# Patient Record
Sex: MALE | Race: BLACK OR AFRICAN AMERICAN
[De-identification: names, ages, dates, MRNs, and addresses within clinical notes are randomized per-mention and may not be internally consistent; named-entity substitution may affect disease eponyms.]

---

## 2020-07-09 ENCOUNTER — HOSPITAL ENCOUNTER (OUTPATIENT)
Dept: HOSPITAL 47 - RADXRMAIN | Age: 45
Discharge: HOME | End: 2020-07-09
Attending: INTERNAL MEDICINE
Payer: COMMERCIAL

## 2020-07-09 DIAGNOSIS — M25.552: Primary | ICD-10-CM

## 2020-07-09 PROCEDURE — 73502 X-RAY EXAM HIP UNI 2-3 VIEWS: CPT

## 2020-07-09 NOTE — XR
Left hip

 

HISTORY: Left hip pain

 

2 views of the left hip

 

 

 

Bone mineralization, joint spaces and alignment are maintained. Phleboliths are present in the pelvis
. No fracture or dislocation.

 

IMPRESSION: Normal left hip.

## 2021-05-18 ENCOUNTER — HOSPITAL ENCOUNTER (OUTPATIENT)
Dept: HOSPITAL 47 - LABWHC1 | Age: 46
Discharge: HOME | End: 2021-05-18
Attending: EMERGENCY MEDICINE
Payer: COMMERCIAL

## 2021-05-18 DIAGNOSIS — Z20.822: Primary | ICD-10-CM

## 2021-07-10 ENCOUNTER — HOSPITAL ENCOUNTER (OUTPATIENT)
Age: 46
Discharge: HOME | End: 2021-07-10
Payer: COMMERCIAL

## 2021-07-10 DIAGNOSIS — M19.012: Primary | ICD-10-CM

## 2023-05-28 ENCOUNTER — HOSPITAL ENCOUNTER (OUTPATIENT)
Dept: HOSPITAL 47 - EC | Age: 48
Setting detail: OBSERVATION
LOS: 1 days | Discharge: HOME | End: 2023-05-29
Attending: INTERNAL MEDICINE | Admitting: INTERNAL MEDICINE
Payer: COMMERCIAL

## 2023-05-28 DIAGNOSIS — R10.31: Primary | ICD-10-CM

## 2023-05-28 DIAGNOSIS — I10: ICD-10-CM

## 2023-05-28 DIAGNOSIS — Z79.899: ICD-10-CM

## 2023-05-28 LAB
ALBUMIN SERPL-MCNC: 4.5 G/DL (ref 3.5–5)
ALP SERPL-CCNC: 54 U/L (ref 38–126)
ALT SERPL-CCNC: 22 U/L (ref 4–49)
AMYLASE SERPL-CCNC: 93 U/L (ref 30–110)
ANION GAP SERPL CALC-SCNC: 9 MMOL/L
APTT BLD: 25.4 SEC (ref 22–30)
AST SERPL-CCNC: 34 U/L (ref 17–59)
BASOPHILS # BLD AUTO: 0 K/UL (ref 0–0.2)
BASOPHILS NFR BLD AUTO: 1 %
BUN SERPL-SCNC: 13 MG/DL (ref 9–20)
CALCIUM SPEC-MCNC: 9.7 MG/DL (ref 8.4–10.2)
CHLORIDE SERPL-SCNC: 106 MMOL/L (ref 98–107)
CO2 SERPL-SCNC: 25 MMOL/L (ref 22–30)
EOSINOPHIL # BLD AUTO: 0.1 K/UL (ref 0–0.7)
EOSINOPHIL NFR BLD AUTO: 3 %
ERYTHROCYTE [DISTWIDTH] IN BLOOD BY AUTOMATED COUNT: 4.64 M/UL (ref 4.3–5.9)
ERYTHROCYTE [DISTWIDTH] IN BLOOD: 14 % (ref 11.5–15.5)
GLUCOSE SERPL-MCNC: 101 MG/DL (ref 74–99)
HCT VFR BLD AUTO: 41.6 % (ref 39–53)
HGB BLD-MCNC: 13.3 GM/DL (ref 13–17.5)
INR PPP: 1 (ref ?–1.2)
LIPASE SERPL-CCNC: 87 U/L (ref 23–300)
LYMPHOCYTES # SPEC AUTO: 1.8 K/UL (ref 1–4.8)
LYMPHOCYTES NFR SPEC AUTO: 42 %
MCH RBC QN AUTO: 28.8 PG (ref 25–35)
MCHC RBC AUTO-ENTMCNC: 32.1 G/DL (ref 31–37)
MCV RBC AUTO: 89.6 FL (ref 80–100)
MONOCYTES # BLD AUTO: 0.2 K/UL (ref 0–1)
MONOCYTES NFR BLD AUTO: 5 %
NEUTROPHILS # BLD AUTO: 2 K/UL (ref 1.3–7.7)
NEUTROPHILS NFR BLD AUTO: 46 %
PLATELET # BLD AUTO: 168 K/UL (ref 150–450)
POTASSIUM SERPL-SCNC: 4.2 MMOL/L (ref 3.5–5.1)
PROT SERPL-MCNC: 7.8 G/DL (ref 6.3–8.2)
PT BLD: 10.4 SEC (ref 9–12)
SODIUM SERPL-SCNC: 140 MMOL/L (ref 137–145)
WBC # BLD AUTO: 4.3 K/UL (ref 3.8–10.6)

## 2023-05-28 PROCEDURE — 74177 CT ABD & PELVIS W/CONTRAST: CPT

## 2023-05-28 PROCEDURE — 86901 BLOOD TYPING SEROLOGIC RH(D): CPT

## 2023-05-28 PROCEDURE — 85610 PROTHROMBIN TIME: CPT

## 2023-05-28 PROCEDURE — 96375 TX/PRO/DX INJ NEW DRUG ADDON: CPT

## 2023-05-28 PROCEDURE — 85730 THROMBOPLASTIN TIME PARTIAL: CPT

## 2023-05-28 PROCEDURE — 93005 ELECTROCARDIOGRAM TRACING: CPT

## 2023-05-28 PROCEDURE — 36415 COLL VENOUS BLD VENIPUNCTURE: CPT

## 2023-05-28 PROCEDURE — 85025 COMPLETE CBC W/AUTO DIFF WBC: CPT

## 2023-05-28 PROCEDURE — 96374 THER/PROPH/DIAG INJ IV PUSH: CPT

## 2023-05-28 PROCEDURE — 86900 BLOOD TYPING SEROLOGIC ABO: CPT

## 2023-05-28 PROCEDURE — 84484 ASSAY OF TROPONIN QUANT: CPT

## 2023-05-28 PROCEDURE — 86850 RBC ANTIBODY SCREEN: CPT

## 2023-05-28 PROCEDURE — 83690 ASSAY OF LIPASE: CPT

## 2023-05-28 PROCEDURE — 96376 TX/PRO/DX INJ SAME DRUG ADON: CPT

## 2023-05-28 PROCEDURE — 82150 ASSAY OF AMYLASE: CPT

## 2023-05-28 PROCEDURE — 83605 ASSAY OF LACTIC ACID: CPT

## 2023-05-28 PROCEDURE — 99285 EMERGENCY DEPT VISIT HI MDM: CPT

## 2023-05-28 PROCEDURE — 96361 HYDRATE IV INFUSION ADD-ON: CPT

## 2023-05-28 PROCEDURE — 80048 BASIC METABOLIC PNL TOTAL CA: CPT

## 2023-05-28 PROCEDURE — 80053 COMPREHEN METABOLIC PANEL: CPT

## 2023-05-28 RX ADMIN — HYDROMORPHONE HYDROCHLORIDE PRN MG: 1 INJECTION, SOLUTION INTRAMUSCULAR; INTRAVENOUS; SUBCUTANEOUS at 21:33

## 2023-05-28 RX ADMIN — HYDROMORPHONE HYDROCHLORIDE PRN MG: 1 INJECTION, SOLUTION INTRAMUSCULAR; INTRAVENOUS; SUBCUTANEOUS at 13:24

## 2023-05-28 NOTE — ED
Abdominal Pain HPI





- General


Chief Complaint: Abdominal Pain


Stated Complaint: R side abd pain


Time Seen by Provider: 05/28/23 07:58


Source: patient, RN notes reviewed, old records reviewed


Mode of arrival: ambulatory


Limitations: no limitations





- History of Present Illness


Initial Comments: 





47-year-old male presents to the emergency room with right lower quadrant pain 

for 2 days.  Denies any fevers.  Does have nausea but no vomiting.  States 

normal bowel movement this morning.  Pain is worse with any movements or 

palpation.  Medical history of hypertension.  Did not take his blood pressure 

medicine this morning.  Occasional marijuana smoker.  Denies drug use or daily 

alcohol use.


MD Complaint: abdominal pain


-: days(s) (2)


Location: RLQ


Radiation: none


Severity scale (1-10): 8


Quality: cramping, sharp


Consistency: constant


Improves With: nothing


Worsens With: movement


Associated Symptoms: nausea





- Related Data


                                    Allergies











Allergy/AdvReac Type Severity Reaction Status Date / Time


 


No Known Allergies Allergy   Verified 05/28/23 07:55














Review of Systems


ROS Statement: 


Those systems with pertinent positive or pertinent negative responses have been 

documented in the HPI.





ROS Other: All systems not noted in ROS Statement are negative.





Past Medical History


Past Medical History: Hypertension


History of Any Multi-Drug Resistant Organisms: None Reported


Past Surgical History: No Surgical Hx Reported


Past Psychological History: No Psychological Hx Reported


Smoking Status: Never smoker


Past Alcohol Use History: None Reported


Past Drug Use History: Marijuana





General Exam


Limitations: no limitations


General appearance: alert, in no apparent distress


Head exam: Present: atraumatic


Eye exam: Present: normal appearance.  Absent: periorbital swelling


ENT exam: Present: mucous membranes moist


Neck exam: Present: full ROM.  Absent: meningismus


Respiratory exam: Present: normal lung sounds bilaterally.  Absent: respiratory 

distress, accessory muscle use


Cardiovascular Exam: Present: regular rate


GI/Abdominal exam: Present: soft, tenderness (Right lower quadrant).  Absent: 

distended, guarding, rigid


Extremities exam: Present: full ROM, normal capillary refill.  Absent: 

tenderness, pedal edema


Neurological exam: Present: alert, oriented X3, normal gait


Psychiatric exam: Present: normal affect, normal mood


Skin exam: Present: warm, dry, normal color.  Absent: cyanosis, diaphoretic, 

petechiae, pallor





Course


                                   Vital Signs











  05/28/23 05/28/23 05/28/23





  07:53 08:41 08:44


 


Temperature 97.8 F  


 


Pulse Rate 64  47 L


 


Respiratory 18  22





Rate   


 


Blood Pressure 170/109  140/82


 


O2 Sat by Pulse 100 100 97





Oximetry   














  05/28/23





  09:00


 


Temperature 


 


Pulse Rate 59 L


 


Respiratory 12





Rate 


 


Blood Pressure 140/82


 


O2 Sat by Pulse 100





Oximetry 














- Reevaluation(s)


Reevaluation #1: 





05/28/23 09:26


Dr Berry at bedside to evaluate. Suggested to place in observation. Regular 

diet.


Time: 09:26





Medical Decision Making





- Medical Decision Making











Was pt. sent in by a medical professional or institution (, PA, NP, urgent 

care, hospital, or nursing home...) When possible be specific


@  -No


Did you speak to anyone other than the patient for history (EMS, parent, family,

police, friend...)? What history was obtained from this source 


@  -No


Did you review nursing and triage notes (agree or disagree)?  Why? 


@  -I reviewed and agree with nursing and triage notes


Were old charts reviewed (outside hosp., previous admission, EMS record, old 

EKG, old radiological studies, urgent care reports/EKG's, nursing home records)?

Report findings 


@  -No old charts were reviewed


Differential Diagnosis (chest pain, altered mental status, abdominal pain women,

abdominal pain men, vaginal bleeding, weakness, fever, dyspnea, syncope, 

headache, dizziness, GI bleed, back pain, seizure, CVA, palpatations, mental 

health, musculoskeletal)? 


@  -Differential Abdominal Pain Men:


Appendicitis, cholecystitis, diverticulosis, ischemic bowel, pancreatitis, 

hepatitis, UTI, gastroenteritis, AAA, incarcerated hernia, bowel obstruction, 

constipation, inflammatory bowel, hepatitis, peptic ulcer disease, splenic 

infarction, perforated viscus, testicular torsion, this is not meant to be an 

all-inclusive list


EKG interpreted by me (3pts min.).


@  -yes , EKG interpreted by me shows sinus rhythm with a ventricular rate of 

65, NV interval 0.200, QRS 0.84, QTC 0.423, normal axis old EKG to compare.


X-rays interpreted by me (1pt min.).


@  -None done


CT interpreted by me (1pt min.).


@  -no


U/S interpreted by me (1pt. min.).


@  -None done


What testing was considered but not performed or refused? (CT, X-rays, U/S, labs

)? Why?


@  -None


What meds were considered but not given or refused? Why?


@  -None


Did you discuss the management of the patient with other professionals 

(professionals i.e. , PA, NP, lab, RT, psych nurse, , , 

teacher, , )? Give summary


@  -Dr. Berry at bedside to evaluate patient, we discussed that the patient 

should be placed in observation due to continued pain without evidence of 

appendicitis or abnormal lab values.  


Was smoking cessation discussed for >3mins.?


@  -No


Was critical care preformed (if so, how long)?


@  -No


Were there social determinants of health that impacted care today? How? 

(Homelessness, low income, unemployed, alcoholism, drug addiction, 

transportation, low edu. Level, literacy, decrease access to med. care, senior care, 

rehab)?


@  -No


Was there de-escalation of care discussed even if they declined (Discuss DNR or 

withdrawal of care, Hospice)? DNR status


@  -No


What co-morbidities impacted this encounter? (DM, HTN, Smoking, COPD, CAD, 

Cancer, CVA, ARF, Chemo, Hep., AIDS, mental health diagnosis, sleep apnea, 

morbid obesity)?


@  -Hypertension


Was patient admitted / discharged? Hospital course, mention meds given and ro

dewey, prescriptions, significant lab abnormalities, going to OR and other 

pertinent info.


@  -Admitted


 Patient presents with right lower quadrant abdominal pain for 2 days.  Denies 

any fevers.  Does have nausea and vomiting.  Denies any penile discharge dysuria

or testicular pain.  No chest pain or difficulty breathing.  


Durin physical exam patient became diaphoretic and began vomiting clear/yellow 

fluid. 


EKG interpreted by me shows sinus rhythm with a ventricular rate of 65, NV 

interval 0.200, QRS 0.84, QTC 0.423, normal axis old EKG to compare.


CBC and electrolytes are unremarkable.  Lactic acid is negative.  Troponin is 

negative at 0.012.  


CT abdomen and pelvis with contrast shows no evidence of obstructive uropathy or

renal calculus.  The appendix appears normal.  No right lower quadrant finding 

to explain patient's pain.  Moderate disc degeneration changes throughout the 

spine.


Dr. Berry at bedside to evaluate, states no clear evidence for appendicitis. 




Due to patient's continued complaint of right lower quadrant abdominal pain will

be placed in observation.  Patient is agreeable to this plan of care.


Per Dr Berry patient can have a regular diet.


Case discussed with Dr. May.


Undiagnosed new problem with uncertain prognosis?


@  -No


Drug Therapy requiring intensive monitoring for toxicity (Heparin, Nitro, 

Insulin, Cardizem)?


@  -No


Were any procedures done?


@  -No


Diagnosis/symptom?


@  -Intractable abdominal pain


Acute, or Chronic, or Acute on Chronic?


@  -Acute


Uncomplicated (without systemic symptoms) or Complicated (systemic symptoms)?


@  -Complicated


Side effects of treatment?


@  -No


Exacerbation, Progression, or Severe Exacerbation?


@  -No


Poses a threat to life or bodily function? How? (Chest pain, USA, MI, pneumonia,

PE, COPD, DKA, ARF, appy, cholecystitis, CVA, Diverticulitis, Homicidal, 

Suicidal, threat to staff... and all critical care pts)


@  -No








- Lab Data


Result diagrams: 


                                 05/28/23 08:28





                                 05/28/23 08:28


                                   Lab Results











  05/28/23 05/28/23 05/28/23 Range/Units





  08:20 08:22 08:28 


 


WBC    4.3  (3.8-10.6)  k/uL


 


RBC    4.64  (4.30-5.90)  m/uL


 


Hgb    13.3  (13.0-17.5)  gm/dL


 


Hct    41.6  (39.0-53.0)  %


 


MCV    89.6  (80.0-100.0)  fL


 


MCH    28.8  (25.0-35.0)  pg


 


MCHC    32.1  (31.0-37.0)  g/dL


 


RDW    14.0  (11.5-15.5)  %


 


Plt Count    168  (150-450)  k/uL


 


MPV    7.8  


 


Neutrophils %    46  %


 


Lymphocytes %    42  %


 


Monocytes %    5  %


 


Eosinophils %    3  %


 


Basophils %    1  %


 


Neutrophils #    2.0  (1.3-7.7)  k/uL


 


Lymphocytes #    1.8  (1.0-4.8)  k/uL


 


Monocytes #    0.2  (0-1.0)  k/uL


 


Eosinophils #    0.1  (0-0.7)  k/uL


 


Basophils #    0.0  (0-0.2)  k/uL


 


PT     (9.0-12.0)  sec


 


INR     (<1.2)  


 


APTT     (22.0-30.0)  sec


 


Sodium     (137-145)  mmol/L


 


Potassium     (3.5-5.1)  mmol/L


 


Chloride     ()  mmol/L


 


Carbon Dioxide     (22-30)  mmol/L


 


Anion Gap     mmol/L


 


BUN     (9-20)  mg/dL


 


Creatinine     (0.66-1.25)  mg/dL


 


Est GFR (CKD-EPI)AfAm     (>60 ml/min/1.73 sqM)  


 


Est GFR (CKD-EPI)NonAf     (>60 ml/min/1.73 sqM)  


 


Glucose     (74-99)  mg/dL


 


Plasma Lactic Acid Tomas     (0.7-2.0)  mmol/L


 


Calcium     (8.4-10.2)  mg/dL


 


Total Bilirubin     (0.2-1.3)  mg/dL


 


AST     (17-59)  U/L


 


ALT     (4-49)  U/L


 


Alkaline Phosphatase     ()  U/L


 


Troponin I     (0.000-0.034)  ng/mL


 


Total Protein     (6.3-8.2)  g/dL


 


Albumin     (3.5-5.0)  g/dL


 


Amylase     ()  U/L


 


Lipase     ()  U/L


 


Blood Type   O Positive   


 


Blood Type Confirm  O Positive    


 


Blood Type Recheck   No Previous Record   


 


Bld Type Recheck Status   CABO Indicated   


 


Antibody Screen   NEGATIVE   


 


Spec Expiration Date   05/31/2023 - 2322 05/28/23 05/28/23 05/28/23 Range/Units





  08:28 08:28 08:28 


 


WBC     (3.8-10.6)  k/uL


 


RBC     (4.30-5.90)  m/uL


 


Hgb     (13.0-17.5)  gm/dL


 


Hct     (39.0-53.0)  %


 


MCV     (80.0-100.0)  fL


 


MCH     (25.0-35.0)  pg


 


MCHC     (31.0-37.0)  g/dL


 


RDW     (11.5-15.5)  %


 


Plt Count     (150-450)  k/uL


 


MPV     


 


Neutrophils %     %


 


Lymphocytes %     %


 


Monocytes %     %


 


Eosinophils %     %


 


Basophils %     %


 


Neutrophils #     (1.3-7.7)  k/uL


 


Lymphocytes #     (1.0-4.8)  k/uL


 


Monocytes #     (0-1.0)  k/uL


 


Eosinophils #     (0-0.7)  k/uL


 


Basophils #     (0-0.2)  k/uL


 


PT  10.4    (9.0-12.0)  sec


 


INR  1.0    (<1.2)  


 


APTT  25.4    (22.0-30.0)  sec


 


Sodium   140   (137-145)  mmol/L


 


Potassium   4.2   (3.5-5.1)  mmol/L


 


Chloride   106   ()  mmol/L


 


Carbon Dioxide   25   (22-30)  mmol/L


 


Anion Gap   9   mmol/L


 


BUN   13   (9-20)  mg/dL


 


Creatinine   1.09   (0.66-1.25)  mg/dL


 


Est GFR (CKD-EPI)AfAm   >90   (>60 ml/min/1.73 sqM)  


 


Est GFR (CKD-EPI)NonAf   79   (>60 ml/min/1.73 sqM)  


 


Glucose   101 H   (74-99)  mg/dL


 


Plasma Lactic Acid Tomas    1.5  (0.7-2.0)  mmol/L


 


Calcium   9.7   (8.4-10.2)  mg/dL


 


Total Bilirubin   0.6   (0.2-1.3)  mg/dL


 


AST   34   (17-59)  U/L


 


ALT   22   (4-49)  U/L


 


Alkaline Phosphatase   54   ()  U/L


 


Troponin I     (0.000-0.034)  ng/mL


 


Total Protein   7.8   (6.3-8.2)  g/dL


 


Albumin   4.5   (3.5-5.0)  g/dL


 


Amylase   93   ()  U/L


 


Lipase   87   ()  U/L


 


Blood Type     


 


Blood Type Confirm     


 


Blood Type Recheck     


 


Bld Type Recheck Status     


 


Antibody Screen     


 


Spec Expiration Date     














  05/28/23 Range/Units





  08:28 


 


WBC   (3.8-10.6)  k/uL


 


RBC   (4.30-5.90)  m/uL


 


Hgb   (13.0-17.5)  gm/dL


 


Hct   (39.0-53.0)  %


 


MCV   (80.0-100.0)  fL


 


MCH   (25.0-35.0)  pg


 


MCHC   (31.0-37.0)  g/dL


 


RDW   (11.5-15.5)  %


 


Plt Count   (150-450)  k/uL


 


MPV   


 


Neutrophils %   %


 


Lymphocytes %   %


 


Monocytes %   %


 


Eosinophils %   %


 


Basophils %   %


 


Neutrophils #   (1.3-7.7)  k/uL


 


Lymphocytes #   (1.0-4.8)  k/uL


 


Monocytes #   (0-1.0)  k/uL


 


Eosinophils #   (0-0.7)  k/uL


 


Basophils #   (0-0.2)  k/uL


 


PT   (9.0-12.0)  sec


 


INR   (<1.2)  


 


APTT   (22.0-30.0)  sec


 


Sodium   (137-145)  mmol/L


 


Potassium   (3.5-5.1)  mmol/L


 


Chloride   ()  mmol/L


 


Carbon Dioxide   (22-30)  mmol/L


 


Anion Gap   mmol/L


 


BUN   (9-20)  mg/dL


 


Creatinine   (0.66-1.25)  mg/dL


 


Est GFR (CKD-EPI)AfAm   (>60 ml/min/1.73 sqM)  


 


Est GFR (CKD-EPI)NonAf   (>60 ml/min/1.73 sqM)  


 


Glucose   (74-99)  mg/dL


 


Plasma Lactic Acid Tomas   (0.7-2.0)  mmol/L


 


Calcium   (8.4-10.2)  mg/dL


 


Total Bilirubin   (0.2-1.3)  mg/dL


 


AST   (17-59)  U/L


 


ALT   (4-49)  U/L


 


Alkaline Phosphatase   ()  U/L


 


Troponin I  <0.012  (0.000-0.034)  ng/mL


 


Total Protein   (6.3-8.2)  g/dL


 


Albumin   (3.5-5.0)  g/dL


 


Amylase   ()  U/L


 


Lipase   ()  U/L


 


Blood Type   


 


Blood Type Confirm   


 


Blood Type Recheck   


 


Bld Type Recheck Status   


 


Antibody Screen   


 


Spec Expiration Date   














- EKG Data


-: EKG Interpreted by Me


EKG shows normal: sinus rhythm (EKG interpreted by me shows sinus rhythm with a 

ventricular rate of 65, NV interval 0.200, QRS 0.84, QTC 0.423, normal axis old 

EKG to compare.)





Disposition


Clinical Impression: 


 Abdominal pain





Disposition: ADMITTED AS IP TO THIS [facility]


Referrals: 


Yesica Gordon MD [Primary Care Provider] - 1-2 days


Decision Date: 05/28/23


Decision Time: 09:29

## 2023-05-28 NOTE — P.GSCN
History of Present Illness


Consult date: 05/28/23


Reason for Consult: 





Right-sided abdominal pain


History of present illness: 





This is a 47-year-old male who has a four-day history of right-sided abdominal 

pain.  Patient states the pain started on Thursday.  It was in the right lower 

quadrant.  Patient states that he has been eating.  He has had normal bowel 

movements.  She was able to work on Thursday and Friday.  His pain increased 

last night which brought him to mention.  His CAT scan shows no signs of 

appendicitis.  His white count is normal.





Past Medical History


Past Medical History: Hypertension


History of Any Multi-Drug Resistant Organisms: None Reported


Past Surgical History: No Surgical Hx Reported


Past Psychological History: No Psychological Hx Reported


Smoking Status: Never smoker


Past Alcohol Use History: None Reported


Past Drug Use History: Marijuana





Medications and Allergies


                                    Allergies











Allergy/AdvReac Type Severity Reaction Status Date / Time


 


No Known Allergies Allergy   Verified 05/28/23 07:55














Surgical - Exam


                                   Vital Signs











Temp Pulse Resp BP Pulse Ox


 


 97.8 F   64   18   170/109   100 


 


 05/28/23 07:53  05/28/23 07:53  05/28/23 07:53  05/28/23 07:53  05/28/23 07:53














- General


well developed, well nourished, no distress





- Eyes


PERRL





- ENT


normal pinna





- Neck


no masses





- Respiratory


normal expansion





- Cardiovascular


Rhythm: regular





- Abdomen





Mild tenderness right lower quadrant.  There is no rebound or guarding.


Abdomen: soft





Results





- Labs





                                 05/28/23 08:28





                                 05/28/23 08:28


                  Abnormal Lab Results - Last 24 Hours (Table)











  05/28/23 Range/Units





  08:28 


 


Glucose  101 H  (74-99)  mg/dL








                                 Diabetes panel











  05/28/23 Range/Units





  08:28 


 


Sodium  140  (137-145)  mmol/L


 


Potassium  4.2  (3.5-5.1)  mmol/L


 


Chloride  106  ()  mmol/L


 


Carbon Dioxide  25  (22-30)  mmol/L


 


BUN  13  (9-20)  mg/dL


 


Creatinine  1.09  (0.66-1.25)  mg/dL


 


Glucose  101 H  (74-99)  mg/dL


 


Calcium  9.7  (8.4-10.2)  mg/dL


 


AST  34  (17-59)  U/L


 


ALT  22  (4-49)  U/L


 


Alkaline Phosphatase  54  ()  U/L


 


Total Protein  7.8  (6.3-8.2)  g/dL


 


Albumin  4.5  (3.5-5.0)  g/dL








                                  Calcium panel











  05/28/23 Range/Units





  08:28 


 


Calcium  9.7  (8.4-10.2)  mg/dL


 


Albumin  4.5  (3.5-5.0)  g/dL








                                 Pituitary panel











  05/28/23 Range/Units





  08:28 


 


Sodium  140  (137-145)  mmol/L


 


Potassium  4.2  (3.5-5.1)  mmol/L


 


Chloride  106  ()  mmol/L


 


Carbon Dioxide  25  (22-30)  mmol/L


 


BUN  13  (9-20)  mg/dL


 


Creatinine  1.09  (0.66-1.25)  mg/dL


 


Glucose  101 H  (74-99)  mg/dL


 


Calcium  9.7  (8.4-10.2)  mg/dL








                                  Adrenal panel











  05/28/23 Range/Units





  08:28 


 


Sodium  140  (137-145)  mmol/L


 


Potassium  4.2  (3.5-5.1)  mmol/L


 


Chloride  106  ()  mmol/L


 


Carbon Dioxide  25  (22-30)  mmol/L


 


BUN  13  (9-20)  mg/dL


 


Creatinine  1.09  (0.66-1.25)  mg/dL


 


Glucose  101 H  (74-99)  mg/dL


 


Calcium  9.7  (8.4-10.2)  mg/dL


 


Total Bilirubin  0.6  (0.2-1.3)  mg/dL


 


AST  34  (17-59)  U/L


 


ALT  22  (4-49)  U/L


 


Alkaline Phosphatase  54  ()  U/L


 


Total Protein  7.8  (6.3-8.2)  g/dL


 


Albumin  4.5  (3.5-5.0)  g/dL














- Imaging


CT scan - abdomen: report reviewed (No evidence of appendicitis)





Assessment and Plan


Assessment: 





Right lower quadrant pain.  Unsure of etiology.  Patient's CAT scan shows no 

signs of appendicitis.  His white count is normal.  There is no left shift.  

Patient will be admitted for pain management and observation.

## 2023-05-28 NOTE — P.HPIM
History of Present Illness


H&P Date: 23


Chief Complaint: Abdominal pain





47-year-old male presents to the emergency room with right lower quadrant pain 

for 2 days.  Denies any fevers.  Does have nausea but no vomiting.  States 

normal bowel movement this morning.  Pain is worse with any movements or 

palpation.  Medical history of hypertension.  Did not take his blood pressure 

medicine this morning.  Occasional marijuana smoker.  Denies drug use or daily 

alcohol use.


EKG interpreted by me shows sinus rhythm with a ventricular rate of 65, NV 

interval 0.200, QRS 0.84, QTC 0.423, normal axis old EKG to compare.


CBC and electrolytes are unremarkable.  Lactic acid is negative.  Troponin is 

negative at 0.012.  


CT abdomen and pelvis with contrast shows no evidence of obstructive uropathy or

renal calculus.  The appendix appears normal.  No right lower quadrant finding 

to explain patient's pain.  Moderate disc degeneration changes throughout the 

spine.


Dr. Berry at bedside to evaluate, states no clear evidence for appendicitis. 




Due to patient's continued complaint of right lower quadrant abdominal pain will

be placed in observation.  











Review of Systems











REVIEW OF SYSTEMS: 


CONSTITUTIONAL: No fever, no malaise, no fatigue. 


HEENT: No recent visual problems or hearing problems. Denied any sore throat. 


CARDIOVASCULAR: No chest pain, orthopnea, PND, no palpitations, no syncope. 


PULMONARY: No shortness of breath, no cough, no hemoptysis. 


GASTROINTESTINAL: No diarrhea, no nausea, no vomiting, no abdominal pain. 


NEUROLOGICAL: No headaches, no weakness, no numbness. 


HEMATOLOGICAL: Denies any bleeding or petechiae. 


GENITOURINARY: Denies any burning micturition, frequency, or urgency. 


MUSCULOSKELETAL/RHEUMATOLOGICAL: Denies any joint pain, swelling, or any muscle 

pain. 


ENDOCRINE: Denies any polyuria or polydipsia. 





The rest of the 14-point review of systems is negative.





Past Medical History


Past Medical History: Hypertension


History of Any Multi-Drug Resistant Organisms: None Reported


Past Surgical History: No Surgical Hx Reported


Past Psychological History: No Psychological Hx Reported


Smoking Status: Never smoker


Past Alcohol Use History: None Reported


Past Drug Use History: Marijuana





- Past Family History


  ** Father


Family Medical History: Cancer, Hypertension


Additional Family Medical History / Comment(s): throat cancer - 





  ** Mother


Family Medical History: Chest Pain / Angina, Hypertension


Additional Family Medical History / Comment(s): "mother passed away from angina"





Medications and Allergies


                                Home Medications











 Medication  Instructions  Recorded  Confirmed  Type


 


Cephalexin [Keflex] 500 mg PO Q12HR 23 History


 


Mupirocin 2% Oint [Bactroban 2% 1 applic TOPICAL TID 23 History





Oint]    


 


SILVER sulfADIAZINE Cream 1 applic TOPICAL DAILY 23 History





[Silvadene 1% Cream]    


 


Sildenafil Citrate 100 mg PO DAILY PRN 23 History


 


amLODIPine [Norvasc] 10 mg PO DAILY 23 History


 


lisinopriL 40 mg PO DAILY 23 History








                                    Allergies











Allergy/AdvReac Type Severity Reaction Status Date / Time


 


No Known Allergies Allergy   Verified 23 12:45














Physical Exam


Vitals: 


                                   Vital Signs











  Temp Pulse Resp BP Pulse Ox


 


 23 13:15   63  20  193/110  97


 


 23 12:30   47 L  23  170/101  99


 


 23 12:00   47 L  18  162/88  100


 


 23 11:30   66  18  167/92  99


 


 23 11:00   48 L  16  154/96  100


 


 23 10:30   50 L  18  168/98  100


 


 23 10:00   48 L  16  191/100  99


 


 23 09:30   48 L  16  140/82  100


 


 23 09:00   59 L  12  140/82  100


 


 23 08:44   47 L  22  140/82  97


 


 23 08:41      100


 


 23 07:53  97.8 F  64  18  170/109  100








                                Intake and Output











 23





 22:59 06:59 14:59


 


Other:   


 


  Weight   99.79 kg














General appearance: alert, in no apparent distress


Head exam: Present: atraumatic


Eye exam: Present: normal appearance.  Absent: periorbital swelling


ENT exam: Present: mucous membranes moist


Neck exam: Present: full ROM.  Absent: meningismus


Respiratory exam: Present: normal lung sounds bilaterally.  Absent: respiratory 

distress, accessory muscle use


Cardiovascular Exam: Present: regular rate


GI/Abdominal exam: Present: soft, tenderness (Right lower quadrant).  Absent: 

distended, guarding, rigid


Extremities exam: Present: full ROM, normal capillary refill.  Absent: 

tenderness, pedal edema


Neurological exam: Present: alert, oriented X3, normal gait


Psychiatric exam: Present: normal affect, normal mood


Skin exam: Present: warm, dry, normal color.  Absent: cyanosis, diaphoretic, 

petechiae, pallor





Results


CBC & Chem 7: 


                                 23 08:28





                                 23 08:28


Labs: 


                  Abnormal Lab Results - Last 24 Hours (Table)











  23 Range/Units





  08:28 


 


Glucose  101 H  (74-99)  mg/dL














Assessment and Plan


Assessment: 





1.  Intractable abdominal pain


Patient states that he has been eating.  He has had normal bowel movements.  She

 was able to work on Thursday and Friday.  His pain increased last night which 

brought him to mention.  His CAT scan shows no signs of appendicitis.  His white

 count is normal.  Gen. surgery is on board and recommending to admit patient 

for observation and pain control





2.  Uncontrolled hypertension; Norvasc 10 mg daily, lisinopril 40 mg daily


- We will monitor blood pressure closely and make some adjustments if blood 

pressure remains markedly elevated





DVT prophylaxis; SCDs


CODE STATUS; full code

## 2023-05-28 NOTE — CT
EXAMINATION TYPE: CT abdomen pelvis w con

CT DLP: 1267.3 mGycm, Automated exposure control for dose reduction was used.

 

DATE OF EXAM: 5/28/2023 9:18 AM

 

COMPARISON: None

 

CLINICAL INDICATION:Male, 47 years old with history of abdominal pain; RLQ pain

 

TECHNIQUE:  Axial CT of the abdomen and pelvis. Sagittal and coronal reformats were created on a [employer] workstation. 

 

Contrast used:100ml mL of Isovue 300 with IV Contrast, 

Oral contrast used: without Oral Contrast 

 

FINDINGS: 

LOWER CHEST: Unremarkable

 

ABDOMEN

LIVER: Unremarkable

GALLBLADDER AND BILE DUCTS: Unremarkable.

PANCREAS: Unremarkable.

SPLEEN: Unremarkable.

ADRENAL GLANDS: Unremarkable.

KIDNEYS AND URETERS: No evidence of hydronephrosis or renal calculus. Right renal cyst.

 

PELVIS

BLADDER: Unremarkable

REPRODUCTIVE: Unremarkable.

 

ABDOMEN & PELVIS

STOMACH AND BOWEL: No evidence of bowel obstruction. Scattered colonic diverticula are present. The a
ppendix is normal.

PERITONEUM/RETROPERITONEUM: No evidence of pneumoperitoneum or free fluid.

VASCULATURE: No evidence of aortic aneurysm. Scattered atherosclerosis of the arterial vasculature.

MUSCULOSKELETAL: No acute osseous abnormalities. Moderate disc degeneration changes are present throu
ghout the thoracolumbar spine.

LYMPH NODES: No gross evidence for lymphadenopathy.

SOFT TISSUE/ABDOMINAL WALL: Unremarkable

 

IMPRESSION:

1.  No evidence of obstructive uropathy or renal calculus. The appendix appears normal. No right lowe
r quadrant finding to explain the patient's pain.

2.  Moderate disc degeneration changes throughout the spine.

## 2023-05-29 VITALS
DIASTOLIC BLOOD PRESSURE: 84 MMHG | TEMPERATURE: 99.1 F | RESPIRATION RATE: 18 BRPM | SYSTOLIC BLOOD PRESSURE: 145 MMHG | HEART RATE: 59 BPM

## 2023-05-29 LAB
ANION GAP SERPL CALC-SCNC: 10 MMOL/L (ref 10–18)
BASOPHILS # BLD AUTO: 0.03 X 10*3/UL (ref 0–0.1)
BASOPHILS NFR BLD AUTO: 0.6 %
BUN SERPL-SCNC: 9.1 MG/DL (ref 9–27)
BUN/CREAT SERPL: 9.31 RATIO (ref 12–20)
CALCIUM SPEC-MCNC: 9.4 MG/DL (ref 8.7–10.3)
CHLORIDE SERPL-SCNC: 105 MMOL/L (ref 96–109)
CO2 SERPL-SCNC: 23.5 MMOL/L (ref 20–27.5)
EOSINOPHIL # BLD AUTO: 0.04 X 10*3/UL (ref 0.04–0.35)
EOSINOPHIL NFR BLD AUTO: 0.7 %
ERYTHROCYTE [DISTWIDTH] IN BLOOD BY AUTOMATED COUNT: 4.33 X 10*6/UL (ref 4.4–5.6)
ERYTHROCYTE [DISTWIDTH] IN BLOOD: 13.9 % (ref 11.5–14.5)
GLUCOSE SERPL-MCNC: 98 MG/DL (ref 70–110)
HCT VFR BLD AUTO: 37.9 % (ref 39.6–50)
HGB BLD-MCNC: 12.6 G/DL (ref 13–17)
IMM GRANULOCYTES BLD QL AUTO: 0.2 %
LYMPHOCYTES # SPEC AUTO: 1.84 X 10*3/UL (ref 0.9–5)
LYMPHOCYTES NFR SPEC AUTO: 34.1 %
MCH RBC QN AUTO: 29.1 PG (ref 27–32)
MCHC RBC AUTO-ENTMCNC: 33.2 G/DL (ref 32–37)
MCV RBC AUTO: 87.5 FL (ref 80–97)
MONOCYTES # BLD AUTO: 0.37 X 10*3/UL (ref 0.2–1)
MONOCYTES NFR BLD AUTO: 6.9 %
NEUTROPHILS # BLD AUTO: 3.1 X 10*3/UL (ref 1.8–7.7)
NEUTROPHILS NFR BLD AUTO: 57.5 %
NRBC BLD AUTO-RTO: 0 /100 WBCS (ref 0–0)
PLATELET # BLD AUTO: 185 X 10*3/UL (ref 140–440)
POTASSIUM SERPL-SCNC: 4 MMOL/L (ref 3.5–5.5)
SODIUM SERPL-SCNC: 139 MMOL/L (ref 135–145)
WBC # BLD AUTO: 5.39 X 10*3/UL (ref 4.5–10)

## 2023-05-29 NOTE — P.PN
Progress Note - Text


Progress Note Date: 05/29/23





The patient has 0.6.  He states his pain is resolved.  He wants to go home.





On exam vital signs are stable.  Abdomen soft.  There is no abdominal 

tenderness.





Resolving right lower quadrant pain.  Patient was discharged home.  He'll 

follow-up with his PCP.

## 2023-05-30 ENCOUNTER — HOSPITAL ENCOUNTER (OUTPATIENT)
Dept: HOSPITAL 47 - RADXRMAIN | Age: 48
Discharge: HOME | End: 2023-05-30
Attending: EMERGENCY MEDICINE
Payer: COMMERCIAL

## 2023-05-30 DIAGNOSIS — T23.001A: Primary | ICD-10-CM

## 2023-05-30 NOTE — XR
EXAMINATION TYPE: XR hand complete RT

 

DATE OF EXAM: 5/30/2023

 

CLINICAL HISTORY: BURN ACROSS KNUCKLES ON RT HAND AT WORK 5/11/23

 

 

TECHNIQUE:  Frontal, lateral and oblique images of the right hand are obtained.

 

COMPARISON: None.

 

FINDINGS:  There is no acute fracture/dislocation evident. Healed fracture right fifth metacarpal. Th
e joint spaces appear within normal limits.  The overlying soft tissue appears unremarkable.

 

IMPRESSION: 

 

There is no acute fracture or dislocation

 

ICD 10 NO FRACTURE, INITIAL EVALUATION

## 2023-06-02 NOTE — P.DS
Providers


Date of admission: 


05/28/23 11:43





Expected date of discharge: 05/29/23


Attending physician: 


Mukund Newell MD





Consults: 





                                        





05/28/23 09:44


Consult Physician Routine 


   Consulting Provider: Shamar Berry


   Consult Reason/Comments: Right lower quadrant abdominal pain


   Do you want consulting provider notified?: Already Contacted











Primary care physician: 


Evan Robert





[facility]: 





47-year-old male presents to the emergency room with right lower quadrant pain 

for 2 days.  Denies any fevers.  Does have nausea but no vomiting.  States 

normal bowel movement this morning.  Pain is worse with any movements or 

palpation.  Medical history of hypertension.  Did not take his blood pressure 

medicine this morning.  Occasional marijuana smoker.  Denies drug use or daily 

alcohol use.


EKG interpreted by me shows sinus rhythm with a ventricular rate of 65, OH 

interval 0.200, QRS 0.84, QTC 0.423, normal axis old EKG to compare.


CBC and electrolytes are unremarkable.  Lactic acid is negative.  Troponin is 

negative at 0.012.  


CT abdomen and pelvis with contrast shows no evidence of obstructive uropathy or

renal calculus.  The appendix appears normal.  No right lower quadrant finding 

to explain patient's pain.  Moderate disc degeneration changes throughout the 

spine.


Dr. Berry at bedside to evaluate, states no clear evidence for appendicitis. 




Due to patient's continued complaint of right lower quadrant abdominal pain will

be placed in observation.  


1.  Intractable abdominal pain


Patient states that he has been eating.  He has had normal bowel movements.  She

was able to work on Thursday and Friday.  His pain increased last night which 

brought him to mention.  His CAT scan shows no signs of appendicitis.  His white

count is normal.  Gen. surgery is on board and recommending to admit patient for

observation and pain control





2.  Uncontrolled hypertension; Norvasc 10 mg daily, lisinopril 40 mg daily


- We will monitor blood pressure closely and make some adjustments if blood 

pressure remains markedly elevated





Resolving right lower quadrant pain.  Patient was discharged home.  He'll 

follow-up with his PCP.



































Patient Condition at Discharge: Good





Plan - Discharge Summary


Discharge Rx Participant: No


New Discharge Prescriptions: 


Continue


   lisinopriL 40 mg PO DAILY


   SILVER sulfADIAZINE Cream [Silvadene 1% Cream] 1 applic TOPICAL DAILY


   Sildenafil Citrate 100 mg PO DAILY PRN


     PRN Reason: E.D.


   amLODIPine [Norvasc] 10 mg PO DAILY


   Mupirocin 2% Oint [Bactroban 2% Oint] 1 applic TOPICAL TID


   Cephalexin [Keflex] 500 mg PO Q12HR


Discharge Medication List





Cephalexin [Keflex] 500 mg PO Q12HR 05/28/23 [History]


Mupirocin 2% Oint [Bactroban 2% Oint] 1 applic TOPICAL TID 05/28/23 [History]


SILVER sulfADIAZINE Cream [Silvadene 1% Cream] 1 applic TOPICAL DAILY 05/28/23 

[History]


Sildenafil Citrate 100 mg PO DAILY PRN 05/28/23 [History]


amLODIPine [Norvasc] 10 mg PO DAILY 05/28/23 [History]


lisinopriL 40 mg PO DAILY 05/28/23 [History]








Follow up Appointment(s)/Referral(s): 


Yesica Gordon MD [Primary Care Provider] - 1-2 days (Patient instructed to 

make a follow-up appointment)


Patient Instructions/Handouts:  Acute Abdominal Pain (DC)


Discharge Disposition: HOME SELF-CARE